# Patient Record
Sex: FEMALE | Race: WHITE
[De-identification: names, ages, dates, MRNs, and addresses within clinical notes are randomized per-mention and may not be internally consistent; named-entity substitution may affect disease eponyms.]

---

## 2019-04-24 ENCOUNTER — HOSPITAL ENCOUNTER (EMERGENCY)
Dept: HOSPITAL 10 - FTE | Age: 59
Discharge: HOME | End: 2019-04-24
Payer: COMMERCIAL

## 2019-04-24 ENCOUNTER — HOSPITAL ENCOUNTER (EMERGENCY)
Dept: HOSPITAL 91 - FTE | Age: 59
Discharge: HOME | End: 2019-04-24
Payer: COMMERCIAL

## 2019-04-24 VITALS — BODY MASS INDEX: 39.18 KG/M2 | HEIGHT: 55 IN | WEIGHT: 169.32 LBS

## 2019-04-24 VITALS — DIASTOLIC BLOOD PRESSURE: 71 MMHG | HEART RATE: 65 BPM | RESPIRATION RATE: 16 BRPM | SYSTOLIC BLOOD PRESSURE: 102 MMHG

## 2019-04-24 DIAGNOSIS — R40.2142: ICD-10-CM

## 2019-04-24 DIAGNOSIS — F41.9: Primary | ICD-10-CM

## 2019-04-24 DIAGNOSIS — R40.2362: ICD-10-CM

## 2019-04-24 LAB
ADD MAN DIFF?: NO
ALANINE AMINOTRANSFERASE: 16 IU/L (ref 13–69)
ALBUMIN/GLOBULIN RATIO: 1.41
ALBUMIN: 4.4 G/DL (ref 3.3–4.9)
ALKALINE PHOSPHATASE: 89 IU/L (ref 42–121)
ANION GAP: 9 (ref 5–13)
ASPARTATE AMINO TRANSFERASE: 20 IU/L (ref 15–46)
B-TYPE NATRIURETIC PEPTIDE: 51 PG/ML (ref 0–125)
BASOPHIL #: 0 10^3/UL (ref 0–0.1)
BASOPHILS %: 0.5 % (ref 0–2)
BILIRUBIN,DIRECT: 0 MG/DL (ref 0–0.2)
BILIRUBIN,TOTAL: 0.2 MG/DL (ref 0.2–1.3)
BLOOD UREA NITROGEN: 18 MG/DL (ref 7–20)
CALCIUM: 9.9 MG/DL (ref 8.4–10.2)
CARBON DIOXIDE: 27 MMOL/L (ref 21–31)
CHLORIDE: 106 MMOL/L (ref 97–110)
CREATININE: 0.69 MG/DL (ref 0.44–1)
EOSINOPHILS #: 0 10^3/UL (ref 0–0.5)
EOSINOPHILS %: 0.1 % (ref 0–7)
GLOBULIN: 3.1 G/DL (ref 1.3–3.2)
GLUCOSE: 151 MG/DL (ref 70–220)
HEMATOCRIT: 38.9 % (ref 37–47)
HEMOGLOBIN: 12.3 G/DL (ref 12–16)
IMMATURE GRANS #M: 0.03 10^3/UL (ref 0–0.03)
IMMATURE GRANS % (M): 0.3 % (ref 0–0.43)
LYMPHOCYTES #: 0.8 10^3/UL (ref 0.8–2.9)
LYMPHOCYTES %: 8.6 % (ref 15–51)
MEAN CORPUSCULAR HEMOGLOBIN: 26.6 PG (ref 29–33)
MEAN CORPUSCULAR HGB CONC: 31.6 G/DL (ref 32–37)
MEAN CORPUSCULAR VOLUME: 84.2 FL (ref 82–101)
MEAN PLATELET VOLUME: 9.8 FL (ref 7.4–10.4)
MONOCYTE #: 0.4 10^3/UL (ref 0.3–0.9)
MONOCYTES %: 4.4 % (ref 0–11)
NEUTROPHIL #: 7.6 10^3/UL (ref 1.6–7.5)
NEUTROPHILS %: 86.1 % (ref 39–77)
NUCLEATED RED BLOOD CELLS #: 0 10^3/UL (ref 0–0)
NUCLEATED RED BLOOD CELLS%: 0 /100WBC (ref 0–0)
PLATELET COUNT: 194 10^3/UL (ref 140–415)
POTASSIUM: 3.9 MMOL/L (ref 3.5–5.1)
RED BLOOD COUNT: 4.62 10^6/UL (ref 4.2–5.4)
RED CELL DISTRIBUTION WIDTH: 12.9 % (ref 11.5–14.5)
SODIUM: 142 MMOL/L (ref 135–144)
TOTAL PROTEIN: 7.5 G/DL (ref 6.1–8.1)
TROPONIN-I: < 0.012 NG/ML (ref 0–0.12)
WHITE BLOOD COUNT: 8.8 10^3/UL (ref 4.8–10.8)

## 2019-04-24 PROCEDURE — 80053 COMPREHEN METABOLIC PANEL: CPT

## 2019-04-24 PROCEDURE — 93005 ELECTROCARDIOGRAM TRACING: CPT

## 2019-04-24 PROCEDURE — 82962 GLUCOSE BLOOD TEST: CPT

## 2019-04-24 PROCEDURE — 71045 X-RAY EXAM CHEST 1 VIEW: CPT

## 2019-04-24 PROCEDURE — 36415 COLL VENOUS BLD VENIPUNCTURE: CPT

## 2019-04-24 PROCEDURE — 99285 EMERGENCY DEPT VISIT HI MDM: CPT

## 2019-04-24 PROCEDURE — 85025 COMPLETE CBC W/AUTO DIFF WBC: CPT

## 2019-04-24 PROCEDURE — 83880 ASSAY OF NATRIURETIC PEPTIDE: CPT

## 2019-04-24 PROCEDURE — 84484 ASSAY OF TROPONIN QUANT: CPT

## 2019-04-24 NOTE — ERD
ER Documentation


Chief Complaint


Chief Complaint





bibRA from home for anxiety +denies CP  hx:anxiety





HPI


This is a 58-year-old female brought in from home for anxiety.  She denies chest


pain but patient states that she has a history of anxiety.  Denies any fevers 


chills.  She did complain of bilateral hand numbness and perioral numbness in 


bilateral feet numbness.  Denies any weakness.  Denies any visual acuity 


changes.





ROS


All systems reviewed and are negative except as per history of present illness.





Medications


Home Meds


Active Scripts


Alprazolam* (Xanax*) 0.5 Mg Tab, 0.5 MG PO Q8H PRN for ANXIETY, #14 TAB


   Prov:ROSLYN DONALD         4/24/19





Allergies


Allergies:  


Coded Allergies:  


     No Known Drug Allergies (Verified  Allergy, Mild, 4/18/08)





PMhx/Soc


Medical and Surgical Hx:  pt denies Medical Hx


History of Surgery:  Yes (Hysterectomy)


Anesthesia Reaction:  No


Hx Alcohol Use:  No


Hx Substance Use:  No


Hx Tobacco Use:  No


Smoking Status:  Never smoker





Physical Exam


Vitals





Vital Signs


  Date      Temp  Pulse  Resp  B/P (MAP)   Pulse Ox  O2          O2 Flow    FiO2


Time                                                 Delivery    Rate


   4/24/19           64    16      120/71        99  Room Air


     03:10                           (87)


   4/24/19  97.5     83    16      131/70        96


     01:17                           (90)





Physical Exam


Const:   No acute distress


Head:   Atraumatic 


Eyes:    Normal Conjunctiva


ENT:    Normal External Ears, Nose and Mouth.


Neck:               Full range of motion. No meningismus.


Resp:   Clear to auscultation bilaterally


Cardio:   Regular rate and rhythm, no murmurs


Abd:    Soft, non tender, non distended. Normal bowel sounds


Skin:   No petechiae or rashes


Back:   No midline or flank tenderness


Ext:    No cyanosis, or edema


Neur:   Awake and alert


Psych:    Normal Mood and Affect


Result Diagram:  


4/24/19 0339 4/24/19 0339





Results 24 hrs





Laboratory Tests


       Test
                                4/24/19
03:09   4/24/19
03:39


       Bedside Glucose                         149 mg/dL


       White Blood Count                                     8.8 10^3/ul


       Red Blood Count                                      4.62 10^6/ul


       Hemoglobin                                              12.3 g/dl


       Hematocrit                                                 38.9 %


       Mean Corpuscular Volume                                   84.2 fl


       Mean Corpuscular Hemoglobin                               26.6 pg


       Mean Corpuscular Hemoglobin
Concent  
                 31.6 g/dl 



       Red Cell Distribution Width                                12.9 %


       Platelet Count                                        194 10^3/UL


       Mean Platelet Volume                                       9.8 fl


       Immature Granulocytes %                                   0.300 %


       Neutrophils %                                              86.1 %


       Lymphocytes %                                               8.6 %


       Monocytes %                                                 4.4 %


       Eosinophils %                                               0.1 %


       Basophils %                                                 0.5 %


       Nucleated Red Blood Cells %                           0.0 /100WBC


       Immature Granulocytes #                             0.030 10^3/ul


       Neutrophils #                                         7.6 10^3/ul


       Lymphocytes #                                         0.8 10^3/ul


       Monocytes #                                           0.4 10^3/ul


       Eosinophils #                                         0.0 10^3/ul


       Basophils #                                           0.0 10^3/ul


       Nucleated Red Blood Cells #                           0.0 10^3/ul


       Sodium Level                                           142 mmol/L


       Potassium Level                                        3.9 mmol/L


       Chloride Level                                         106 mmol/L


       Carbon Dioxide Level                                    27 mmol/L


       Anion Gap                                                       9


       Blood Urea Nitrogen                                 Pending


       Creatinine                                          Pending


       Est Glomerular Filtrat Rate
mL/min   
              Pending 



       Glucose Level                                       Pending


       Calcium Level                                       Pending


       Total Bilirubin                                     Pending


       Direct Bilirubin                                    Pending


       Indirect Bilirubin                                  Pending


       Aspartate Amino Transf
(AST/SGOT)    
              Pending 



       Alanine Aminotransferase
(ALT/SGPT)  
              Pending 



       Alkaline Phosphatase                                Pending


       Troponin I                                          < 0.012 ng/ml


       B-Type Natriuretic Peptide                          Pending


       Total Protein                                       Pending


       Albumin                                             Pending


       Globulin                                            Pending


       Albumin/Globulin Ratio                              Pending








Procedures/MDM


EKG: 


Rate/Rhythm:             [Normal Sinus Rhythm]


QRS, ST, T-waves:    [No changes consistent w/ acute ischemia]


Impression:      [No evidence of ischemia or arrhythmia]





Chest X-ray 1V Interpreted by me:


Soft Tissue:                                               No acute 


abnormalities


Bones:                                                    No acute abnormalities


Mediastinum/Cardiac Silhouette/Lungs:     [No acute abnormalities]








Medical decision making: Patient's thoracic symptoms have stabilized while in 


the department and are stable for outpatient follow up.


Exam and work up not consistent w/ ischemia, arrhythmia, PE or dissection.





Departure


Diagnosis:  


   Primary Impression:  


   Anxiety


Condition:  Stable


Patient Instructions:  Anxiety Reaction











ROSLYN DONALD             Apr 24, 2019 04:48